# Patient Record
Sex: FEMALE | Race: OTHER | Employment: OTHER | URBAN - METROPOLITAN AREA
[De-identification: names, ages, dates, MRNs, and addresses within clinical notes are randomized per-mention and may not be internally consistent; named-entity substitution may affect disease eponyms.]

---

## 2021-02-12 ENCOUNTER — HOSPITAL ENCOUNTER (OUTPATIENT)
Dept: PREADMISSION TESTING | Age: 66
Discharge: HOME | End: 2021-02-12
Payer: OTHER GOVERNMENT

## 2021-02-12 PROCEDURE — U0003 INFECTIOUS AGENT DETECTION BY NUCLEIC ACID (DNA OR RNA); SEVERE ACUTE RESPIRATORY SYNDROME CORONAVIRUS 2 (SARS-COV-2) (CORONAVIRUS DISEASE [COVID-19]), AMPLIFIED PROBE TECHNIQUE, MAKING USE OF HIGH THROUGHPUT TECHNOLOGIES AS DESCRIBED BY CMS-2020-01-R: HCPCS

## 2021-02-13 LAB — SARS-COV-2, COV2NT: NOT DETECTED

## 2021-02-17 NOTE — H&P
[facility]  [facility]    Name:  Wood Mccracken  MR#:   [id number]  :  1955  ACCOUNT #:  [de-identified]  ADMIT DATE:  2021    HISTORY OF PRESENT ILLNESS:  The patient presented today for scheduled stabilization of her right ankle. She does have instability of the syndesmosis. She has pain during ambulation and weightbearing, which is unresponsive to immobilization and conservative care. She presents for scheduled surgical management. PAST MEDICAL HISTORY:  Includes degenerative arthritis, obesity, cancer, history of COPD. CURRENT MEDICATIONS:  Include Tylenol, albuterol, amlodipine, atorvastatin, baclofen, buspirone, clonazepam, cyclobenzaprine, dextroamphetamine, famotidine, furosemide, hydrocodone, melatonin, MiraLax, vitamin D3 and morphine. ALLERGIES:  PENICILLIN. PAST SURGICAL HISTORY:  She has had prior appendectomy, hysterectomy, kidney removal, knee surgery, stomach surgery, all without complication from surgery or anesthesia. SOCIAL HISTORY:  She denies alcohol product and a former smoker. FAMILY HISTORY:  Unremarkable to the chief complaint. PHYSICAL EXAMINATION:  VASCULAR:  Dorsalis pedis and posterior tibial pulses are palpable. Cap fill time less than 3 seconds. NEUROLOGIC:  All epicritic sensations are intact. MUSCULOSKELETAL:  There is some pain on palpation of the anterior talofibular ligament as well as syndesmosis. There is pain with end range of motion with dorsiflexion and plantarflexion. Muscle strength testing within normal limits. There is pain and instability at the syndesmosis. IMPRESSION:  Syndesmotic disruption. PLAN:  I consulted with the patient. We discussed options and alternatives and recommendations were given. She was given a full surgical consultation regarding stabilization of her syndesmosis. All risks, benefits and alternatives were explained and all pre, mavis and postoperative course were discussed.   She was given the opportunity to ask questions and all questions were answered. The consent was reviewed. All risks were discussed. It is signed and on the chart. She was given prescription for clindamycin 300 mg one p.o. b.i.d. which she will begin preoperatively and one for Percocet one p.o. q.4-6 hours p.r.n. pain. We will follow in the office for postoperative management. Lulu Sykes DPM      [id number]  D:  02/17/2021 6:55  T:  02/17/2021 8:02  JOB #:  [id number]  CC:   [address] [place] [address]

## 2021-02-18 ENCOUNTER — HOSPITAL ENCOUNTER (OUTPATIENT)
Age: 66
Setting detail: OUTPATIENT SURGERY
Discharge: HOME | End: 2021-02-18
Attending: PODIATRIST | Admitting: PODIATRIST
Payer: OTHER GOVERNMENT

## 2021-02-18 ENCOUNTER — ANESTHESIA EVENT (OUTPATIENT)
Dept: SURGERY | Age: 66
End: 2021-02-18
Payer: OTHER GOVERNMENT

## 2021-02-18 ENCOUNTER — ANESTHESIA (OUTPATIENT)
Dept: SURGERY | Age: 66
End: 2021-02-18
Payer: OTHER GOVERNMENT

## 2021-02-18 VITALS
RESPIRATION RATE: 16 BRPM | SYSTOLIC BLOOD PRESSURE: 137 MMHG | DIASTOLIC BLOOD PRESSURE: 61 MMHG | OXYGEN SATURATION: 95 % | HEIGHT: 65 IN | HEART RATE: 67 BPM | BODY MASS INDEX: 40.22 KG/M2 | WEIGHT: 241.38 LBS | TEMPERATURE: 96.7 F

## 2021-02-18 PROBLEM — M25.371 ANKLE INSTABILITY, RIGHT: Status: ACTIVE | Noted: 2021-02-18

## 2021-02-18 PROBLEM — S93.431S: Status: ACTIVE | Noted: 2021-02-18

## 2021-02-18 LAB
INR PPP: 1.1 (ref 0.8–1.2)
PROTHROMBIN TIME: 13.6 SEC (ref 11.5–15.2)

## 2021-02-18 PROCEDURE — 85610 PROTHROMBIN TIME: CPT

## 2021-02-18 PROCEDURE — 76060000032 HC ANESTHESIA 0.5 TO 1 HR: Performed by: PODIATRIST

## 2021-02-18 PROCEDURE — 74011000250 HC RX REV CODE- 250: Performed by: NURSE ANESTHETIST, CERTIFIED REGISTERED

## 2021-02-18 PROCEDURE — 77030012508 HC MSK AIRWY LMA AMBU -A: Performed by: ANESTHESIOLOGY

## 2021-02-18 PROCEDURE — 2709999900 HC NON-CHARGEABLE SUPPLY: Performed by: PODIATRIST

## 2021-02-18 PROCEDURE — 77030020269 HC MISC IMPL: Performed by: PODIATRIST

## 2021-02-18 PROCEDURE — 74011250636 HC RX REV CODE- 250/636: Performed by: NURSE ANESTHETIST, CERTIFIED REGISTERED

## 2021-02-18 PROCEDURE — C1713 ANCHOR/SCREW BN/BN,TIS/BN: HCPCS | Performed by: PODIATRIST

## 2021-02-18 PROCEDURE — 36415 COLL VENOUS BLD VENIPUNCTURE: CPT

## 2021-02-18 PROCEDURE — 76210000016 HC OR PH I REC 1 TO 1.5 HR: Performed by: PODIATRIST

## 2021-02-18 PROCEDURE — 76210000021 HC REC RM PH II 0.5 TO 1 HR: Performed by: PODIATRIST

## 2021-02-18 PROCEDURE — 77030020782 HC GWN BAIR PAWS FLX 3M -B: Performed by: PODIATRIST

## 2021-02-18 PROCEDURE — 77030040361 HC SLV COMPR DVT MDII -B: Performed by: PODIATRIST

## 2021-02-18 PROCEDURE — 76010000138 HC OR TIME 0.5 TO 1 HR: Performed by: PODIATRIST

## 2021-02-18 PROCEDURE — 77030002916 HC SUT ETHLN J&J -A: Performed by: PODIATRIST

## 2021-02-18 PROCEDURE — 74011250636 HC RX REV CODE- 250/636: Performed by: ANESTHESIOLOGY

## 2021-02-18 PROCEDURE — 77030002922 HC SUT FBRWRE ARTH -B: Performed by: PODIATRIST

## 2021-02-18 PROCEDURE — 77030000032 HC CUF TRNQT ZIMM -B: Performed by: PODIATRIST

## 2021-02-18 PROCEDURE — 77030002933 HC SUT MCRYL J&J -A: Performed by: PODIATRIST

## 2021-02-18 PROCEDURE — 74011250636 HC RX REV CODE- 250/636: Performed by: PODIATRIST

## 2021-02-18 PROCEDURE — 64447 NJX AA&/STRD FEMORAL NRV IMG: CPT | Performed by: PODIATRIST

## 2021-02-18 DEVICE — SYSTEM IMPL TI UHMWPE KNOTLESS FOR SYNDESMOSIS FIX: Type: IMPLANTABLE DEVICE | Site: FOOT | Status: FUNCTIONAL

## 2021-02-18 RX ORDER — SODIUM CHLORIDE, SODIUM LACTATE, POTASSIUM CHLORIDE, CALCIUM CHLORIDE 600; 310; 30; 20 MG/100ML; MG/100ML; MG/100ML; MG/100ML
25 INJECTION, SOLUTION INTRAVENOUS CONTINUOUS
Status: DISCONTINUED | OUTPATIENT
Start: 2021-02-18 | End: 2021-02-18 | Stop reason: HOSPADM

## 2021-02-18 RX ORDER — ALBUTEROL SULFATE 0.83 MG/ML
2.5 SOLUTION RESPIRATORY (INHALATION)
Status: DISCONTINUED | OUTPATIENT
Start: 2021-02-18 | End: 2021-02-18 | Stop reason: HOSPADM

## 2021-02-18 RX ORDER — CLINDAMYCIN PHOSPHATE 900 MG/50ML
900 INJECTION, SOLUTION INTRAVENOUS ONCE
Status: COMPLETED | OUTPATIENT
Start: 2021-02-18 | End: 2021-02-18

## 2021-02-18 RX ORDER — ONDANSETRON 2 MG/ML
INJECTION INTRAMUSCULAR; INTRAVENOUS AS NEEDED
Status: DISCONTINUED | OUTPATIENT
Start: 2021-02-18 | End: 2021-02-18 | Stop reason: HOSPADM

## 2021-02-18 RX ORDER — HYDROMORPHONE HYDROCHLORIDE 2 MG/ML
INJECTION, SOLUTION INTRAMUSCULAR; INTRAVENOUS; SUBCUTANEOUS AS NEEDED
Status: DISCONTINUED | OUTPATIENT
Start: 2021-02-18 | End: 2021-02-18 | Stop reason: HOSPADM

## 2021-02-18 RX ORDER — DEXAMETHASONE SODIUM PHOSPHATE 4 MG/ML
INJECTION, SOLUTION INTRA-ARTICULAR; INTRALESIONAL; INTRAMUSCULAR; INTRAVENOUS; SOFT TISSUE AS NEEDED
Status: DISCONTINUED | OUTPATIENT
Start: 2021-02-18 | End: 2021-02-18 | Stop reason: HOSPADM

## 2021-02-18 RX ORDER — HYDROMORPHONE HYDROCHLORIDE 2 MG/ML
0.2 INJECTION, SOLUTION INTRAMUSCULAR; INTRAVENOUS; SUBCUTANEOUS AS NEEDED
Status: DISCONTINUED | OUTPATIENT
Start: 2021-02-18 | End: 2021-02-18 | Stop reason: HOSPADM

## 2021-02-18 RX ORDER — ONDANSETRON 2 MG/ML
4 INJECTION INTRAMUSCULAR; INTRAVENOUS ONCE
Status: DISCONTINUED | OUTPATIENT
Start: 2021-02-18 | End: 2021-02-18 | Stop reason: HOSPADM

## 2021-02-18 RX ORDER — MIDAZOLAM HYDROCHLORIDE 1 MG/ML
INJECTION, SOLUTION INTRAMUSCULAR; INTRAVENOUS AS NEEDED
Status: DISCONTINUED | OUTPATIENT
Start: 2021-02-18 | End: 2021-02-18 | Stop reason: HOSPADM

## 2021-02-18 RX ORDER — HYDROMORPHONE HYDROCHLORIDE 2 MG/ML
0.5 INJECTION, SOLUTION INTRAMUSCULAR; INTRAVENOUS; SUBCUTANEOUS
Status: DISCONTINUED | OUTPATIENT
Start: 2021-02-18 | End: 2021-02-18 | Stop reason: HOSPADM

## 2021-02-18 RX ORDER — CLINDAMYCIN PHOSPHATE 900 MG/50ML
900 INJECTION INTRAVENOUS ONCE
Status: DISCONTINUED | OUTPATIENT
Start: 2021-02-18 | End: 2021-02-18

## 2021-02-18 RX ORDER — KETAMINE HYDROCHLORIDE 10 MG/ML
INJECTION, SOLUTION INTRAMUSCULAR; INTRAVENOUS AS NEEDED
Status: DISCONTINUED | OUTPATIENT
Start: 2021-02-18 | End: 2021-02-18 | Stop reason: HOSPADM

## 2021-02-18 RX ORDER — HYDROCODONE BITARTRATE AND ACETAMINOPHEN 5; 325 MG/1; MG/1
1 TABLET ORAL AS NEEDED
Status: DISCONTINUED | OUTPATIENT
Start: 2021-02-18 | End: 2021-02-18 | Stop reason: HOSPADM

## 2021-02-18 RX ORDER — FLUMAZENIL 0.1 MG/ML
0.2 INJECTION INTRAVENOUS
Status: DISCONTINUED | OUTPATIENT
Start: 2021-02-18 | End: 2021-02-18 | Stop reason: HOSPADM

## 2021-02-18 RX ORDER — PROPOFOL 10 MG/ML
INJECTION, EMULSION INTRAVENOUS AS NEEDED
Status: DISCONTINUED | OUTPATIENT
Start: 2021-02-18 | End: 2021-02-18 | Stop reason: HOSPADM

## 2021-02-18 RX ORDER — LIDOCAINE HYDROCHLORIDE 20 MG/ML
INJECTION, SOLUTION EPIDURAL; INFILTRATION; INTRACAUDAL; PERINEURAL AS NEEDED
Status: DISCONTINUED | OUTPATIENT
Start: 2021-02-18 | End: 2021-02-18 | Stop reason: HOSPADM

## 2021-02-18 RX ORDER — EPHEDRINE SULFATE/0.9% NACL/PF 50 MG/5 ML
SYRINGE (ML) INTRAVENOUS AS NEEDED
Status: DISCONTINUED | OUTPATIENT
Start: 2021-02-18 | End: 2021-02-18 | Stop reason: HOSPADM

## 2021-02-18 RX ORDER — SODIUM CHLORIDE, SODIUM LACTATE, POTASSIUM CHLORIDE, CALCIUM CHLORIDE 600; 310; 30; 20 MG/100ML; MG/100ML; MG/100ML; MG/100ML
125 INJECTION, SOLUTION INTRAVENOUS CONTINUOUS
Status: DISCONTINUED | OUTPATIENT
Start: 2021-02-18 | End: 2021-02-18 | Stop reason: HOSPADM

## 2021-02-18 RX ORDER — DIPHENHYDRAMINE HYDROCHLORIDE 50 MG/ML
12.5 INJECTION, SOLUTION INTRAMUSCULAR; INTRAVENOUS
Status: DISCONTINUED | OUTPATIENT
Start: 2021-02-18 | End: 2021-02-18 | Stop reason: HOSPADM

## 2021-02-18 RX ORDER — ROPIVACAINE HYDROCHLORIDE 5 MG/ML
INJECTION, SOLUTION EPIDURAL; INFILTRATION; PERINEURAL
Status: COMPLETED | OUTPATIENT
Start: 2021-02-18 | End: 2021-02-18

## 2021-02-18 RX ADMIN — DEXAMETHASONE SODIUM PHOSPHATE 4 MG: 4 INJECTION, SOLUTION INTRAMUSCULAR; INTRAVENOUS at 12:29

## 2021-02-18 RX ADMIN — MIDAZOLAM HYDROCHLORIDE 2 MG: 1 INJECTION, SOLUTION INTRAMUSCULAR; INTRAVENOUS at 11:53

## 2021-02-18 RX ADMIN — KETAMINE HYDROCHLORIDE 10 MG: 10 INJECTION, SOLUTION INTRAMUSCULAR; INTRAVENOUS at 13:16

## 2021-02-18 RX ADMIN — SODIUM CHLORIDE, SODIUM LACTATE, POTASSIUM CHLORIDE, AND CALCIUM CHLORIDE 125 ML/HR: 600; 310; 30; 20 INJECTION, SOLUTION INTRAVENOUS at 11:23

## 2021-02-18 RX ADMIN — LIDOCAINE HYDROCHLORIDE 80 MG: 20 INJECTION, SOLUTION EPIDURAL; INFILTRATION; INTRACAUDAL; PERINEURAL at 12:31

## 2021-02-18 RX ADMIN — HYDROMORPHONE HYDROCHLORIDE 0.5 MG: 2 INJECTION, SOLUTION INTRAMUSCULAR; INTRAVENOUS; SUBCUTANEOUS at 13:39

## 2021-02-18 RX ADMIN — PROPOFOL 200 MG: 10 INJECTION, EMULSION INTRAVENOUS at 12:31

## 2021-02-18 RX ADMIN — MIDAZOLAM HYDROCHLORIDE 2 MG: 1 INJECTION, SOLUTION INTRAMUSCULAR; INTRAVENOUS at 12:24

## 2021-02-18 RX ADMIN — Medication 10 MG: at 12:53

## 2021-02-18 RX ADMIN — ONDANSETRON HYDROCHLORIDE 4 MG: 2 INJECTION INTRAMUSCULAR; INTRAVENOUS at 13:07

## 2021-02-18 RX ADMIN — CLINDAMYCIN PHOSPHATE 900 MG: 900 INJECTION, SOLUTION INTRAVENOUS at 12:41

## 2021-02-18 RX ADMIN — MEPIVACAINE HYDROCHLORIDE 10 ML: 20 INJECTION, SOLUTION EPIDURAL; INFILTRATION at 12:08

## 2021-02-18 RX ADMIN — KETAMINE HYDROCHLORIDE 20 MG: 10 INJECTION, SOLUTION INTRAMUSCULAR; INTRAVENOUS at 12:34

## 2021-02-18 RX ADMIN — HYDROMORPHONE HYDROCHLORIDE 0.5 MG: 2 INJECTION, SOLUTION INTRAMUSCULAR; INTRAVENOUS; SUBCUTANEOUS at 13:51

## 2021-02-18 RX ADMIN — SODIUM CHLORIDE, SODIUM LACTATE, POTASSIUM CHLORIDE, AND CALCIUM CHLORIDE: 600; 310; 30; 20 INJECTION, SOLUTION INTRAVENOUS at 12:53

## 2021-02-18 RX ADMIN — ROPIVACAINE HYDROCHLORIDE 15 ML: 5 INJECTION, SOLUTION EPIDURAL; INFILTRATION; PERINEURAL at 12:08

## 2021-02-18 RX ADMIN — MIDAZOLAM HYDROCHLORIDE 3 MG: 1 INJECTION, SOLUTION INTRAMUSCULAR; INTRAVENOUS at 11:50

## 2021-02-18 RX ADMIN — HYDROMORPHONE HYDROCHLORIDE 0.5 MG: 2 INJECTION, SOLUTION INTRAMUSCULAR; INTRAVENOUS; SUBCUTANEOUS at 13:22

## 2021-02-18 RX ADMIN — HYDROMORPHONE HYDROCHLORIDE 0.5 MG: 2 INJECTION, SOLUTION INTRAMUSCULAR; INTRAVENOUS; SUBCUTANEOUS at 13:17

## 2021-02-18 NOTE — ANESTHESIA PREPROCEDURE EVALUATION
Relevant Problems   No relevant active problems       Anesthetic History     PONV          Review of Systems / Medical History  Patient summary reviewed, nursing notes reviewed and pertinent labs reviewed    Pulmonary    COPD          Pertinent negatives: No sleep apnea     Neuro/Psych         Psychiatric history     Cardiovascular    Hypertension          Hyperlipidemia      Comments: H/o DVT s/p vena cava filter- on coumadin ( last dose 5 days ago, on lovenox bridge)   GI/Hepatic/Renal             Pertinent negatives: No GERD  Comments: IBS  Renal CA s/p nephrectomy Endo/Other        Obesity and arthritis     Other Findings              Physical Exam    Airway  Mallampati: II  TM Distance: 4 - 6 cm  Neck ROM: normal range of motion        Cardiovascular    Rhythm: regular  Rate: normal         Dental      Comments: Mostly edentulous; two remaining teeth are stable   Pulmonary      Decreased breath sounds           Abdominal  GI exam deferred       Other Findings            Anesthetic Plan    ASA: 3  Anesthesia type: MAC and general - backup      Post-op pain plan if not by surgeon: peripheral nerve block single      Anesthetic plan and risks discussed with: Patient

## 2021-02-18 NOTE — PROGRESS NOTES
Progress Note  Date:2021       Room:/  Patient Name:Shirley Hardwick     YOB: 1955     Age:66 y.o. Subjective    Subjective   Review of Systems  Objective         Vitals Last 24 Hours:  TEMPERATURE:  Temp  Av.4 °F (36.9 °C)  Min: 98.4 °F (36.9 °C)  Max: 98.4 °F (36.9 °C)  RESPIRATIONS RANGE: Resp  Av  Min: 18  Max: 18  PULSE OXIMETRY RANGE: SpO2  Av %  Min: 98 %  Max: 98 %  PULSE RANGE: Pulse  Av  Min: 82  Max: 82  BLOOD PRESSURE RANGE: Systolic (52XCL), ERB:328 , Min:145 , JPN:924   ; Diastolic (49AJY), WBF:69, Min:69, Max:69    I/O (24Hr): No intake or output data in the 24 hours ending 21 1202  Objective  Labs/Imaging/Diagnostics    Labs:  CBC:No results for input(s): WBC, RBC, HGB, HCT, MCV, RDW, PLT, HGBEXT, HCTEXT, PLTEXT in the last 72 hours. CHEMISTRIES:No results for input(s): NA, K, CL, CO2, BUN, CA, PHOS, MG in the last 72 hours. No lab exists for component: CREATININE, GLUCOSEPT/INR:  Recent Labs     21  1047   INR 1.1     APTT:No results for input(s): APTT in the last 72 hours. LIVER PROFILE:No results for input(s): AST, ALT in the last 72 hours. No lab exists for component: BILIDIR, BILITOT, ALKPHOS  No results found for: ALT, AST, GGT, GGTP, AP, APIT, APX, CBIL, TBIL, TBILI    Imaging Last 24 Hours:  No results found. Assessment//Plan   Active Problems:    Ankle instability, right (2021)      Ankle syndesmosis disruption, right, sequela (2021)      Assessment & Plan   Patient is scheduled for stabilization of syndesmosis. All risks benefits discussed and all options reviewed. She was given opportunity to ask questions and all answered.     Electronically signed by Wilder Cali MD on 2021 at 12:02 PM

## 2021-02-18 NOTE — BRIEF OP NOTE
Brief Postoperative Note    Patient: Xander Soria  YOB: 1955  MRN: [id number]    Date of Procedure: 2/18/2021     Pre-Op Diagnosis: TORN SYNDESMOSIS RIGHT    Post-Op Diagnosis: same    Procedure(s):  STABILIZATION OF SYNDESMOSIS RIGHT, MAC PLUS POPLITEAL BLOCK  **SPEC POP**    Surgeon(s):  Anup Estrada MD    Surgical Assistant: Surg Asst-1: Beryle Fishman    Anesthesia: MAC     Estimated Blood Loss (mL): minimal    Complications: none    Specimens: none    Implants:   Implant Name Type Inv.  Item Serial No.  Lot No. LRB No. Used Action   SYSTEM IMPL TI UHMWPE KNOTLESS FOR SYNDESMOSIS FIX - [id number]  SYSTEM IMPL TI UHMWPE KNOTLESS FOR SYNDESMOSIS FIX  [id number] Right 1 Implanted   TWO HOLE PLATE    [employer] [id number] Right 1 Implanted       Drains: none    Findings: none    Electronically Signed by Inder Norman MD on 2/18/2021 at 1:13 PM

## 2021-02-18 NOTE — ANESTHESIA PROCEDURE NOTES
Peripheral Block    Start time: 2/18/2021 11:58 AM  End time: 2/18/2021 12:08 PM  Performed by: Gavino Dennison MD  Authorized by: Gavino Dennison MD       Pre-procedure: Indications: at surgeon's request, post-op pain management and primary anesthetic    Preanesthetic Checklist: patient identified, risks and benefits discussed, site marked, timeout performed, anesthesia consent given and patient being monitored    Timeout time: see RN note.           Block Type:   Block Type:  Popliteal  Laterality:  Right  Monitoring:  Continuous pulse ox, responsive to questions, oxygen, frequent vital sign checks and heart rate  Injection Technique:  Single shot  Procedures: ultrasound guided and nerve stimulator    Patient Position: supine  Prep: chlorhexidine    Location:  Lower thigh  Needle Type:  Stimuplex  Needle Gauge:  20 G  Needle Localization:  Nerve stimulator and ultrasound guidance  Medication Injected:  Mepivacaine (PF) 2 % (POLOCAINE) injection, 10 mL  ropivacaine (PF) (NAROPIN)(0.5%) 5 mg/mL injection, 15 mL  Med Admin Time: 2/18/2021 12:08 PM    Assessment:  Number of attempts:  1  Injection Assessment:  Incremental injection every 5 mL, negative aspiration for CSF, no paresthesia, ultrasound image on chart, local visualized surrounding nerve on ultrasound, negative aspiration for blood, no intravascular symptoms and low pressure verified by pressure monitor  Patient tolerance:  Patient tolerated the procedure well with no immediate complications

## 2021-02-18 NOTE — ANESTHESIA POSTPROCEDURE EVALUATION
Procedure(s):  STABILIZATION OF SYNDESMOSIS RIGHT, MAC PLUS POPLITEAL BLOCK  **SPEC POP**.    MAC, general - backup    Anesthesia Post Evaluation        Comments: Post-Anesthesia Evaluation and Assessment    Cardiovascular Function/Vital Signs  /62   Pulse 68   Temp 37.3 °C (99.1 °F)   Resp 14   Ht 5' 5\" (1.651 m)   Wt 109.5 kg (241 lb 6 oz)   SpO2 97%   BMI 40.17 kg/m²     Patient is status post Procedure(s):  STABILIZATION OF SYNDESMOSIS RIGHT, MAC PLUS POPLITEAL BLOCK  **SPEC POP**. Nausea/Vomiting: Controlled. Postoperative hydration reviewed and adequate. Pain:  Pain Scale 1: Numeric (0 - 10) (02/18/21 1350)  Pain Intensity 1: 7 (02/18/21 1350)   Managed. Neurological Status:   Neuro (WDL): Exceptions to WDL (02/18/21 1323)   At baseline. Mental Status and Level of Consciousness: Arousable. Pulmonary Status:   O2 Device: Nasal cannula (02/18/21 1340)   Adequate oxygenation and airway patent. Complications related to anesthesia: None    Post-anesthesia assessment completed. No concerns. Patient has met all discharge requirements. Signed By: Geri Qureshi MD    February 18, 2021                   INITIAL Post-op Vital signs:   Vitals Value Taken Time   /59 02/18/21 1415   Temp 37.3 °C (99.1 °F) 02/18/21 1323   Pulse 59 02/18/21 1417   Resp 12 02/18/21 1417   SpO2 94 % 02/18/21 1417   Vitals shown include unvalidated device data.

## 2021-02-18 NOTE — PERIOP NOTES
Dr Nicole Sun notified patient had coumadin last 2/13/21 and lovenox last on 2/17/21. Okay to proceed per Dr Nicole Sun.

## 2021-02-18 NOTE — PERIOP NOTES
Reviewed PTA medication list with patient/caregiver and patient/caregiver denies any additional medications. Patient admits to having a responsible adult care for them at home for at least 24 hours after surgery. Patient encouraged to use gown warming system and informed that using said warming gown to regulate body temperature prior to a procedure has been shown to help reduce the risks of blood clots and infection. Patient's pharmacy of choice verified and documented in PTA medication section. Dual skin assessment & fall risk band verification completed with KADEEM Garrison RN.

## 2021-02-18 NOTE — OP NOTES
[facility]  OPERATIVE REPORT    Name:  Gabby Aceves  MR#:   [id number]  :  1955  ACCOUNT #:  [de-identified]  DATE OF SERVICE:  2021    PREOPERATIVE DIAGNOSES:  Disruption of the syndesmosis, right; ankle instability, right. POSTOPERATIVE DIAGNOSES:  Disruption of the syndesmosis, right; ankle instability, right. PROCEDURE PERFORMED:  Stabilization of the syndesmosis with TightRope. SURGEON:  Brittany Benjamin DPM    ASSISTANT:  none    ANESTHESIA:  Sedation with a popliteal block. HEMOSTASIS:  Calf tourniquet at 250 mmHg. COMPLICATIONS:  None. SPECIMENS REMOVED:  none    IMPLANTS:  Materials, two TightRopes and one two-hole plate, 3-0 Monocryl, and 3-0 nylon. ESTIMATED BLOOD LOSS:  Minimal.    INJECTABLES:  None. PATHOLOGY:  None. DISPOSITION:  The patient tolerated the procedure and anesthesia without complications and left the operating room with vascular status intact and vital signs stable. PROCEDURE:  The patient was brought to the operating room where the popliteal block was achieved in the preop holding area. She was placed on the table in supine position and placed under sedation. A calf tourniquet was placed on a well-padded area on the right calf. The patient's foot and leg were prepped and draped in the usual sterile manner. The leg was elevated and exsanguinated. Tourniquet was inflated. Attention was then directed to the lateral aspect of the left ankle where a 6-cm incision was made over the lateral malleolus. Incision was taken down through the subcutaneous tissue making sure to identify, retract, and preserve all neurovascular and tendinous structures encountered. Dissection was taken down to bone. A linear periosteal incision was made, which reflected and exposed the fibula. At this time, the syndesmosis was checked under C-arm. A large clamp was placed to close down the syndesmosis.   At this time, utilizing standard fixation techniques and with a two-hole plate, two TightRopes were placed across the syndesmosis and tightened down. The syndesmosis was noted to close down nicely. The wound was flushed with copious amounts of sterile saline solution. Position was checked and confirmed with a C-arm. At this time, deep closure followed with 3-0 Monocryl. Skin was closed with 3-0 nylon in a simple interrupted fashion. A dry sterile compressive bandage consisting of Xeroform, 4x4's, and a well-padded bandage was applied. Tourniquet was released and hyperemic flush was noted to return to all digits one through five. She was placed into a fiberglass posterior splint. She tolerated the procedure without complication and left the operating room with vascular status intact and vital signs stable. She enjoyed an uneventful time in the recovery room and was given both oral and written postoperative instructions. She will follow in the office for postoperative management. XU [id number]  D:  02/18/2021 13:23  T:  02/18/2021 18:05  JOB #:  [id number]  CC:   [address] [place] [address]

## 2021-02-18 NOTE — PERIOP NOTES
Family updated, stated she is 25 minutes away from the hospital. Advised to start driving now to  pt.

## 2021-02-18 NOTE — DISCHARGE INSTRUCTIONS
Patient Education        9 Things To Do If You've Been Exposed to COVID-19    Stay home. If you've been exposed, you should stay in quarantine for at least 14 days. Ask your doctor when it's safe to end your quarantine. Don't go to school, work, or public areas. And don't use public transportation, ride-shares, or taxis unless you have no choice. Leave your home only if you need to get medical care. But call the doctor's office first so they know you're coming, and wear a cloth face cover when you go. Call your doctor. Call your doctor or other health professional to let them know that you've been exposed. They might want you to be tested, or they may have other instructions for you. If you become sick, wear a face cover when you are around other people. It can help stop the spread of the virus when you cough or sneeze. Limit contact with people in your home. If possible, stay in a separate bedroom and use a separate bathroom. Avoid contact with pets and other animals. Cover your mouth and nose with a tissue when you cough or sneeze. Then throw it in the trash right away. Wash your hands often, especially after you cough or sneeze. Use soap and water, and scrub for at least 20 seconds. If soap and water aren't available, use an alcohol-based hand . Don't share personal household items. These include bedding, towels, cups and glasses, and eating utensils. Clean and disinfect your home every day. Use household  or disinfectant wipes or sprays. Take special care to clean things that you grab with your hands. These include doorknobs, remote controls, phones, and handles on your refrigerator and microwave. And don't forget countertops, tabletops, bathrooms, and computer keyboards. Current as of: December 18, 2020               Content Version: 12.7  © 2006-2021 [employer].    Care instructions adapted under license by [facility] [employer] (which disclaims liability or warranty for this information). If you have questions about a medical condition or this instruction, always ask your healthcare professional. [place] [address] any warranty or liability for your use of this information. DISCHARGE SUMMARY from Nurse    PATIENT INSTRUCTIONS:    After general anesthesia or intravenous sedation, for 24 hours or while taking prescription Narcotics:  · Limit your activities  · Do not drive and operate hazardous machinery  · Do not make important personal or business decisions  · Do  not drink alcoholic beverages  · If you have not urinated within 8 hours after discharge, please contact your surgeon on call. Report the following to your surgeon:  · Excessive pain, swelling, redness or odor of or around the surgical area  · Temperature over 100.5  · Nausea and vomiting lasting longer than 4 hours or if unable to take medications  · Any signs of decreased circulation or nerve impairment to extremity: change in color, persistent  numbness, tingling, coldness or increase pain  · Any questions    What to do at Home:  [address]    If you experience any of the following symptoms heavy bleeding, fevers, severe pain, circulation changes, please follow up with dr Ty Hannon    *  Please give a list of your current medications to your Primary Care Provider. *  Please update this list whenever your medications are discontinued, doses are      changed, or new medications (including over-the-counter products) are added. *  Please carry medication information at all times in case of emergency situations. These are general instructions for a healthy lifestyle:    No smoking/ No tobacco products/ Avoid exposure to second hand smoke  Surgeon General's Warning:  Quitting smoking now greatly reduces serious risk to your health.     Obesity, smoking, and sedentary lifestyle greatly increases your risk for illness    A healthy diet, regular physical exercise & weight monitoring are important for maintaining a healthy lifestyle    You may be retaining fluid if you have a history of heart failure or if you experience any of the following symptoms:  Weight gain of 3 pounds or more overnight or 5 pounds in a week, increased swelling in our hands or feet or shortness of breath while lying flat in bed. Please call your doctor as soon as you notice any of these symptoms; do not wait until your next office visit. The discharge information has been reviewed with the patient and caregiver. The patient and caregiver verbalized understanding. Discharge medications reviewed with the patient and caregiver and appropriate educational materials and side effects teaching were provided. ___________________________________________________________________________________________________________________________________Post op instructions     Non weight bearing with crutches  Keep leg elevated  Ice to ankle 30 minutes on 30 minutes off for 3 days     Gus [place], MDDischarge Instructions  AMELIA PAGE  Foot  Procedures    You are being discharged after a procedure to your ankle. Please continue to use your walker or crutches as long as you need them. Typically, you will be able to begin putting your heel on the floor for balance only no walking  Dressing care-  You will need to keep your foot elevated for the next several days. Let your comfort help you decide how long your foot can be down over the next few days. During the first few days, your foot will feel tight and uncomfortable if it is down too long. It is normal for you to see a little blood staining on the dressing over your incision sites. It will be necessary to keep the dressing dry at all times. NEVER remove your dressing as it has been applied to hold your foot in the corrected position.  Failure to follow this instruction may result in serious compromise of your correction. Medications- Please use your pain medication as directed. Refills can only be obtained during normal office hours. It is always best in call before noon. Please use one aspirin a day (81 mg for 325 mg) for the first few months until you resume normal activity on your leg. Cautions- please report the following:  #1- unusual pain or pressure inside the dressing  #2- fever above 100.5  #3- any chest pain or shortness of breath  #4- any reactions to your medications that would necessitate a change in your medication  Call 911 for any serious life-threatening emergencies    Follow up appointments have already been arranged for you as follows:  Please call the office at [phone] if you need to change any of these.     Sheryle Grade, MD 2/18/2021 1:15 PM    Patient armband removed and shredded

## (undated) DEVICE — GOWN,AURORA,NONRNF,XL,30/CS: Brand: MEDLINE

## (undated) DEVICE — BANDAGE,GAUZE,CONFORMING,2"X75",STRL,LF: Brand: MEDLINE INDUSTRIES, INC.

## (undated) DEVICE — REM POLYHESIVE ADULT PATIENT RETURN ELECTRODE: Brand: VALLEYLAB

## (undated) DEVICE — DRESSING PETRO W3XL8IN N ADH OIL EMUL GZ CURAD

## (undated) DEVICE — SUT MONOCRYL PLUS UD 4-0 --

## (undated) DEVICE — SPONGE GZ W4XL4IN COT 12 PLY TYP VII WVN C FLD DSGN

## (undated) DEVICE — SUT MONOCRYL PLUS UD 3-0 --

## (undated) DEVICE — SUTURE MCRYL SZ 3-0 L27IN ABSRB UD L26MM SH 1/2 CIR Y416H

## (undated) DEVICE — SUTURE ETHLN SZ 4-0 L18IN NONABSORBABLE BLK L19MM PS-2 3/8 1667H

## (undated) DEVICE — BANDAGE COMPR W4INXL10YD WHITE/BEIGE E MTRX HK LOOP CLSR

## (undated) DEVICE — BNDG,ELSTC,MATRIX,STRL,6"X5YD,LF,HOOK&LP: Brand: MEDLINE

## (undated) DEVICE — ZIMMER® STERILE DISPOSABLE TOURNIQUET CUFF WITH PROTECTIVE SLEEVE AND PLC, SINGLE PORT, SINGLE BLADDER, 18 IN. (46 CM)

## (undated) DEVICE — PADDING CAST W4INXL4YD ST COT COHESIVE HND TEARABLE SPEC

## (undated) DEVICE — GARMENT,MEDLINE,DVT,INT,CALF,MED, GEN2: Brand: MEDLINE

## (undated) DEVICE — SUT ETHLN 3-0 18IN PS2 BLK --

## (undated) DEVICE — SUTURE FIBERWIRE SZ 2 W/ TAPERED NEEDLE BLUE L38IN NONABSORB BLU L26.5MM 1/2 CIRCLE AR7200

## (undated) DEVICE — STRAP,POSITIONING,KNEE/BODY,FOAM,4X60": Brand: MEDLINE

## (undated) DEVICE — PACK PROCEDURE SURG EXTREMITY CUST